# Patient Record
Sex: MALE
[De-identification: names, ages, dates, MRNs, and addresses within clinical notes are randomized per-mention and may not be internally consistent; named-entity substitution may affect disease eponyms.]

---

## 2020-09-03 ENCOUNTER — APPOINTMENT (OUTPATIENT)
Dept: OTOLARYNGOLOGY | Facility: CLINIC | Age: 30
End: 2020-09-03
Payer: COMMERCIAL

## 2020-09-03 VITALS — HEIGHT: 75 IN | TEMPERATURE: 98.2 F | BODY MASS INDEX: 21.76 KG/M2 | WEIGHT: 175 LBS

## 2020-09-03 DIAGNOSIS — Z78.9 OTHER SPECIFIED HEALTH STATUS: ICD-10-CM

## 2020-09-03 DIAGNOSIS — J01.00 ACUTE MAXILLARY SINUSITIS, UNSPECIFIED: ICD-10-CM

## 2020-09-03 PROBLEM — Z00.00 ENCOUNTER FOR PREVENTIVE HEALTH EXAMINATION: Status: ACTIVE | Noted: 2020-09-03

## 2020-09-03 PROCEDURE — 92567 TYMPANOMETRY: CPT

## 2020-09-03 PROCEDURE — 99214 OFFICE O/P EST MOD 30 MIN: CPT | Mod: 25

## 2020-09-03 PROCEDURE — 31231 NASAL ENDOSCOPY DX: CPT

## 2020-09-03 PROCEDURE — 92557 COMPREHENSIVE HEARING TEST: CPT

## 2020-09-03 RX ORDER — LORATADINE 5 MG/5 ML
SOLUTION, ORAL ORAL
Refills: 0 | Status: ACTIVE | COMMUNITY

## 2020-09-03 RX ORDER — FLUTICASONE PROPIONATE 50 UG/1
50 SPRAY, METERED NASAL DAILY
Qty: 1 | Refills: 3 | Status: ACTIVE | COMMUNITY
Start: 2020-09-03 | End: 1900-01-01

## 2020-09-03 RX ORDER — PSEUDOEPHEDRINE HCL 30 MG
TABLET ORAL
Refills: 0 | Status: ACTIVE | COMMUNITY

## 2020-09-03 NOTE — CONSULT LETTER
[Dear  ___] : Dear  [unfilled], [Courtesy Letter:] : I had the pleasure of seeing your patient, [unfilled], in my office today. [Please see my note below.] : Please see my note below. [Consult Closing:] : Thank you very much for allowing me to participate in the care of this patient.  If you have any questions, please do not hesitate to contact me. [Sincerely,] : Sincerely, [FreeTextEntry3] : Bárbara Dc MD\par

## 2020-09-03 NOTE — ASSESSMENT
[FreeTextEntry1] : He has had nasal congestion, nasal obstruction, cough, sore throat, phlegm, and sinus pressure since August 20. He had some improvement on Zithromax. He said COVID testing was negative. On nasal endoscopy, he did have mild nasal mucosal edema and mild reflux related laryngeal changes. There was no purulent drainage. He may have a persistent low-grade sinus infection. We also discussed the possibility of allergies and reflux. He also has a sensation of eustachian tube dysfunction. This is likely due to nasal congestion. His ear exam and audiogram are normal\par \par PLAN\par \par -findings and management options discussed in detail with the patient. \par -Nasal saline rinses, nasal steroid spray (such as fluticasone), antihistamine/decongestant as needed\par -we discussed placing him on another course of antibiotics. Based on his symptoms, i am starting him on augmentin. I recommended he take the medication with food and consider taking a probiotic\par -Allergy evaluation recommended. \par -Reflux precautions and a trial of pepcid\par -I asked them to speak with Dr. Stover to see if he requires pulmonary evaluation or pulmonary testing for the cough and chest symptoms.\par -good aural hygiene reviewed\par -annual audiogram as needed. \par -we have requested his prior ENT chart \par -follow up in 2 weeks\par -call and return earlier if any concerns. \par

## 2020-09-03 NOTE — HISTORY OF PRESENT ILLNESS
[de-identified] : YUE GONZALES is a 29 year patient here for a two-week history of sinusitis. He had nasal congestion, nasal obstruction, cough, mild sore throat, and sinus pressure. His symptoms started around August 20. He took a Z-Jules and it helped somewhat. The throat phlegm improved. He had white spots on his tonsils which resolved. He has no dysphagia. He said his lungs were clear on exam. He tested negative for COVID on August 17 and August 20. Antibody testing was also negative. He said he did have white spots on his tonsils that have since improved. He has been usinga Neti pot. He has history of allergies althoug he has not had testing. He is not currently on medication for reflux but he may have that as well. He has had some stomach symptoms.  He denies recent COVID exposure. He also has mild intermittent ear pressure and popping.

## 2020-09-18 ENCOUNTER — APPOINTMENT (OUTPATIENT)
Dept: OTOLARYNGOLOGY | Facility: CLINIC | Age: 30
End: 2020-09-18
Payer: COMMERCIAL

## 2020-09-18 VITALS — WEIGHT: 175 LBS | TEMPERATURE: 97.3 F | HEIGHT: 75 IN | BODY MASS INDEX: 21.76 KG/M2

## 2020-09-18 DIAGNOSIS — R59.9 ENLARGED LYMPH NODES, UNSPECIFIED: ICD-10-CM

## 2020-09-18 PROCEDURE — 99213 OFFICE O/P EST LOW 20 MIN: CPT

## 2020-09-18 NOTE — HISTORY OF PRESENT ILLNESS
[de-identified] : 9/3/20- YUE GONZALES is a 29 year patient here for a two-week history of sinusitis. He had nasal congestion, nasal obstruction, cough, mild sore throat, and sinus pressure. His symptoms started around August 20. He took a Z-Jules and it helped somewhat. The throat phlegm improved. He had white spots on his tonsils which resolved. He has no dysphagia. He said his lungs were clear on exam. He tested negative for COVID on August 17 and August 20. Antibody testing was also negative. He said he did have white spots on his tonsils that have since improved. He has been usinga Neti pot. He has history of allergies althoug he has not had testing. He is not currently on medication for reflux but he may have that as well. He has had some stomach symptoms.  He denies recent COVID exposure. He also has mild intermittent ear pressure and popping.\par  [FreeTextEntry1] : \par 9/18/20- YUE GONZALES is here for follow up. He took the antibiotics. He has occasional ear pressure, cough, and frontal sinus pressure but is overall much better.

## 2020-09-18 NOTE — ASSESSMENT
[FreeTextEntry1] : He is feeling a lot better after finishing the antibiotics. He has mild intermittent symptoms. He still has mildly enlarged/shotty cervical lymphadenopathy. It may be from the recent infection.\par \par PLAN\par \par -findings and management options discussed in detail with the patient. \par -Nasal saline rinses, nasal steroid spray (such as fluticasone), antihistamine/decongestant as needed\par -Allergy evaluation \par -Reflux precautions and pepcid as needed\par -I discussed management of the mildly enlarged cervical lymph nodes. They may be mildly enlarged from his recent infection. His options are observation with repeat exam in one month or an imaging study such as ultrasound. He is going to observe it for one month. I will see him back then. If he notices any changes in his exam, he will return earlier\par -call and return earlier if any concerns. \par

## 2020-11-10 ENCOUNTER — APPOINTMENT (OUTPATIENT)
Dept: OTOLARYNGOLOGY | Facility: CLINIC | Age: 30
End: 2020-11-10

## 2023-01-10 ENCOUNTER — APPOINTMENT (OUTPATIENT)
Dept: OTOLARYNGOLOGY | Facility: CLINIC | Age: 33
End: 2023-01-10
Payer: COMMERCIAL

## 2023-01-10 DIAGNOSIS — H69.83 OTHER SPECIFIED DISORDERS OF EUSTACHIAN TUBE, BILATERAL: ICD-10-CM

## 2023-01-10 DIAGNOSIS — H65.02 ACUTE SEROUS OTITIS MEDIA, LEFT EAR: ICD-10-CM

## 2023-01-10 DIAGNOSIS — H93.293 OTHER ABNORMAL AUDITORY PERCEPTIONS, BILATERAL: ICD-10-CM

## 2023-01-10 PROCEDURE — 99213 OFFICE O/P EST LOW 20 MIN: CPT | Mod: 25

## 2023-01-10 PROCEDURE — 92567 TYMPANOMETRY: CPT

## 2023-01-10 PROCEDURE — 92557 COMPREHENSIVE HEARING TEST: CPT

## 2023-01-10 PROCEDURE — 31231 NASAL ENDOSCOPY DX: CPT

## 2023-01-10 RX ORDER — AMOXICILLIN AND CLAVULANATE POTASSIUM 875; 125 MG/1; MG/1
875-125 TABLET, COATED ORAL
Qty: 20 | Refills: 1 | Status: COMPLETED | COMMUNITY
Start: 2020-09-03 | End: 2023-01-10

## 2023-01-10 NOTE — ASSESSMENT
[FreeTextEntry1] : He has a history eustachian tube dysfunction.  He has an acute serous otitis media on the left side related to his recent flight.  There is no acute infection.\par \par He has a history of chronic rhinosinusitis.  He had nasal mucosal edema, deviated septum, inferior turbinate hypertrophy on exam.  There is no purulent drainage.\par \par He has reflux.  Is on medication\par \par Plan\par -Findings and management options were discussed with the patient.\par = Good ear hygiene\par -Noise precautions\par -Monitor hearing\par -We discussed management of the middle ear effusion.  His options are observation with medical therapy and myringotomy.  He is going to try Afrin for 3 days, and nasal steroid spray, and decongestant.  He may also consider a burst of steroids.  He will see how he feels over the next few days.  If he wishes to try the steroids or undergo myringotomy, he will call me\par -I recommended nasal saline irrigations in addition to nasal steroid spray\par -Continue with reflux management\par -I will see him back in approximately 2 weeks\par -He should call return earlier if he has any concerns or worsening symptoms

## 2023-01-10 NOTE — HISTORY OF PRESENT ILLNESS
[de-identified] : YUE GONZALES is a 32 year old patient with a history of chronic rhinitis and reflux here for ear pressure.  He flew on January 9.  He developed left ear pressure and sensation eustachian tube dysfunction.  He has no tinnitus, dizziness, or otorrhea.  He does have chronic nasal congestion, cough, and intermittent sinus pressure.  He is not on nasal medications.  He does have a history of reflux and is on medication for that.\par \par He gets approximately 2 sinus infections each year\par Allergy testing was negative\par He has reflux and is on medication.  He sees a gastroenterologist\par He may have TMJ dysfunction he has no history of recurrent middle ear infections or prior otologic surgery\par

## 2023-01-12 PROBLEM — H93.293 ABNORMAL AUDITORY PERCEPTION OF BOTH EARS: Status: ACTIVE | Noted: 2020-09-03

## 2023-01-31 ENCOUNTER — APPOINTMENT (OUTPATIENT)
Dept: OTOLARYNGOLOGY | Facility: CLINIC | Age: 33
End: 2023-01-31

## 2023-07-20 ENCOUNTER — APPOINTMENT (OUTPATIENT)
Dept: OTOLARYNGOLOGY | Facility: CLINIC | Age: 33
End: 2023-07-20
Payer: COMMERCIAL

## 2023-07-20 VITALS — HEIGHT: 75 IN | WEIGHT: 175 LBS | BODY MASS INDEX: 21.76 KG/M2

## 2023-07-20 DIAGNOSIS — J31.0 CHRONIC RHINITIS: ICD-10-CM

## 2023-07-20 DIAGNOSIS — J03.91 ACUTE RECURRENT TONSILLITIS, UNSPECIFIED: ICD-10-CM

## 2023-07-20 DIAGNOSIS — K21.9 GASTRO-ESOPHAGEAL REFLUX DISEASE W/OUT ESOPHAGITIS: ICD-10-CM

## 2023-07-20 PROCEDURE — 99213 OFFICE O/P EST LOW 20 MIN: CPT | Mod: 25

## 2023-07-20 PROCEDURE — 31575 DIAGNOSTIC LARYNGOSCOPY: CPT

## 2023-07-20 RX ORDER — PREDNISONE 10 MG/1
10 TABLET ORAL
Qty: 12 | Refills: 0 | Status: ACTIVE | COMMUNITY
Start: 2023-07-20 | End: 1900-01-01

## 2023-07-20 RX ORDER — FAMOTIDINE 20 MG/1
20 TABLET, FILM COATED ORAL
Qty: 60 | Refills: 1 | Status: ACTIVE | COMMUNITY
Start: 2023-07-20 | End: 1900-01-01

## 2023-07-20 RX ORDER — FAMOTIDINE 20 MG/1
20 TABLET, FILM COATED ORAL
Qty: 60 | Refills: 1 | Status: COMPLETED | COMMUNITY
Start: 2020-09-03 | End: 2023-07-20

## 2023-07-20 NOTE — ASSESSMENT
[FreeTextEntry1] : He has a history of recurrent tonsillitis.  He had 2 infections over the past 2 months.  He has mild sore throat, cough, and phlegm.  He does report a history of a chronic dry cough for many years.  The cough may be exacerbated because of the recent infection.  He also suffers from reflux.\par \par Plan\par -Findings and management options were discussed with the patient.\par -Salt water gargles as needed.  Avoid alcohol based mouthwashes\par -Reflux precautions recommended.  He was given literature \par -I recommended Pepcid twice daily\par -Nasal saline irrigations and antihistamine or decongestant as needed\par -I recommended a nasal steroid spray\par -We discussed whether or not he meets the criteria for tonsillectomy.  If he has at least 3 infections a year for 3 years, he may benefit from surgery.  However, we would need to wait for him to recover from the recent infections.  I will speak with him about this further when he returns\par -Consider pulmonary evaluation for the chronic cough.  If work-up is negative, consider neurogenic cough\par -Follow-up in 2 weeks\par -He should call return earlier if he has any concerns or worsening symptoms

## 2023-07-20 NOTE — HISTORY OF PRESENT ILLNESS
[de-identified] : YUE GONZALES is a 32 year old patient with a history of chronic rhinitis, reflux, and eustachian tube dysfunction here for tonsillitis.  His ear symptoms improved after his last visit.  He said that he was diagnosed and treated with strep on June 20 and again on July 5.  He was treated with Augmentin initially and likely clindamycin.  He is feeling better but still has a sore throat, phlegm, and cough.  He said that his lungs were clear.  He just finished the antibiotics.  He has a several year history of a chronic dry cough.  He has a history of reflux but is not currently on medication.\par \par He said that he does get recurrent tonsillitis.  He feels he has approximately 3 infections each year which require antibiotics.  This has been going on for more than 3 years.\par \par He has a history of recurrent sinusitis with approximately 2 infections each year\par Allergy testing was negative\par He has reflux but is not currently on medication\par He has a history of TMJ dysfunction\par No history of prior otologic disease\par \par \par

## 2023-08-03 ENCOUNTER — APPOINTMENT (OUTPATIENT)
Dept: OTOLARYNGOLOGY | Facility: CLINIC | Age: 33
End: 2023-08-03